# Patient Record
Sex: MALE | Race: WHITE | NOT HISPANIC OR LATINO | URBAN - METROPOLITAN AREA
[De-identification: names, ages, dates, MRNs, and addresses within clinical notes are randomized per-mention and may not be internally consistent; named-entity substitution may affect disease eponyms.]

---

## 2017-11-13 ENCOUNTER — OFFICE VISIT (OUTPATIENT)
Dept: SPORTS MEDICINE | Facility: CLINIC | Age: 62
End: 2017-11-13

## 2017-11-13 VITALS
RESPIRATION RATE: 16 BRPM | HEIGHT: 74 IN | WEIGHT: 203 LBS | HEART RATE: 62 BPM | DIASTOLIC BLOOD PRESSURE: 88 MMHG | SYSTOLIC BLOOD PRESSURE: 126 MMHG | BODY MASS INDEX: 26.05 KG/M2 | OXYGEN SATURATION: 99 %

## 2017-11-13 DIAGNOSIS — M79.604 RIGHT LEG PAIN: Primary | ICD-10-CM

## 2017-11-13 DIAGNOSIS — M54.16 LUMBAR RADICULOPATHY: ICD-10-CM

## 2017-11-13 PROCEDURE — 99204 OFFICE O/P NEW MOD 45 MIN: CPT | Performed by: FAMILY MEDICINE

## 2017-11-13 RX ORDER — PREDNISONE 20 MG/1
TABLET ORAL
Qty: 20 TABLET | Refills: 0 | Status: SHIPPED | OUTPATIENT
Start: 2017-11-13 | End: 2017-12-18

## 2017-11-13 NOTE — PROGRESS NOTES
"Matias is a 62 y.o. year old male    Chief Complaint   Patient presents with   • Lower Extremity Issue     issues with right quad pain for 4 weeks. He has xrays on disc       History of Present Illness  HPI     R leg pain: ×4 weeks.  No known trauma.  He states that over the past year, he has run last then he typically does.  He has run the [place] now 8 times.  He had some setbacks this year in February as he tore his medial meniscus and had this partially removed.  He also ran the [place] on October 8, 2017 which showed thereafter began having pain in his right upper leg.  Admits that he has not been as diligent about training this year which could contribute to some of his symptoms.  History of lumbar disc surgery at L4-5 years ago.  His symptoms he is described as numbness and pain that starts in the right hip down through the anterior upper lower leg.  He is also had difficulty laying on his right side due to the pain.  He has been favoring his right side his gait.  No foot drop.  He's had some muscle twitches as well which he describes as involuntary.  Has been to chiropractor, urgent care, family physician.  Has been on muscle relaxer, Medrol Dosepak, hydrocodone.    He is type II diabetic.    I have reviewed the patient's medical, family, and social history in detail and updated the computerized patient record.    Review of Systems   Constitutional: Negative for fever.   Musculoskeletal:        Per HPI   Skin: Negative for rash.   Neurological: Negative for weakness and numbness.   Psychiatric/Behavioral: Negative for sleep disturbance.   All other systems reviewed and are negative.      /88 (BP Location: Right arm, Patient Position: Sitting, Cuff Size: Adult)  Pulse 62  Resp 16  Ht 74\" (188 cm)  Wt 203 lb (92.1 kg)  SpO2 99%  BMI 26.06 kg/m2     Physical Exam    Vital signs reviewed.   General: No acute distress.  Eyes: conjunctiva clear; pupils equally round and reactive  ENT: " external ears and nose atraumatic; oropharynx clear  CV: no peripheral edema, 2+ distal pulses  Resp: normal respiratory effort, no use of accessory muscles  Skin: no rashes or wounds; normal turgor  Psych: mood and affect appropriate; recent and remote memory intact  Neuro: sensation to light touch intact; 2+ DTR L4, S1 bilateral    MSK Exam:  Ortho Exam    L-spine: No tenderness or warmth; negative straight leg raise bilateral; negative ANGEL bilateral; negative Stenchfield  L hip: No tenderness or warmth; full range of motion; negative logroll  R hip: No warmth; tenderness along the mid substance of the iliotibial band; full range of motion; negative logroll  No pain with single or double leg hop    Outside disc was brought and I reviewed these images independently.  Osteoarthritis of the right hip.  Multilevel degenerative disc disease of the L-spine but no spondylolisthesis.    Diagnoses and all orders for this visit:    Right leg pain  -     Ambulatory Referral to Physical Therapy Evaluate and treat  -     predniSONE (DELTASONE) 20 MG tablet; Take 3 tabs daily for 3 d, then take 2 tabs daily for 3 d, then take 1 tab daily for 3 d, then take 0.5 tab daily for 3 d then stop    Lumbar radiculopathy  -     Ambulatory Referral to Physical Therapy Evaluate and treat  -     predniSONE (DELTASONE) 20 MG tablet; Take 3 tabs daily for 3 d, then take 2 tabs daily for 3 d, then take 1 tab daily for 3 d, then take 0.5 tab daily for 3 d then stop      Discussed differentials.  Doubt stress reaction.  Doubt that iliotibial band could be causing all of his symptoms, especially the numbness and tingling.  Despite what he has been told, I do believe this to be a variant of lumbar radiculopathy and probably a little higher in the L-spine.  I recommend tapering dose of prednisone as well as physical therapy.  Follow-up with me in 6 weeks.    EMR Dragon/Transcription disclaimer:    Much of this encounter note is an electronic  transcription/translation of spoken language to printed text.  The electronic translation of spoken language may permit erroneous, or at times, nonsensical words or phrases to be inadvertently transcribed.  Although I have reviewed the note for such errors some may still exist.

## 2017-11-28 ENCOUNTER — TREATMENT (OUTPATIENT)
Dept: PHYSICAL THERAPY | Facility: CLINIC | Age: 62
End: 2017-11-28

## 2017-11-28 DIAGNOSIS — M79.604 RIGHT LEG PAIN: Primary | ICD-10-CM

## 2017-11-28 DIAGNOSIS — M54.16 RADICULOPATHY, LUMBAR REGION: ICD-10-CM

## 2017-11-28 PROCEDURE — 97161 PT EVAL LOW COMPLEX 20 MIN: CPT | Performed by: PHYSICAL THERAPIST

## 2017-11-28 PROCEDURE — 97112 NEUROMUSCULAR REEDUCATION: CPT | Performed by: PHYSICAL THERAPIST

## 2017-11-28 PROCEDURE — 97110 THERAPEUTIC EXERCISES: CPT | Performed by: PHYSICAL THERAPIST

## 2017-11-28 NOTE — PROGRESS NOTES
"Physical Therapy Initial Evaluation and Plan of Care    TIME IN 1335 TIME OUT 1438  Patient: Matias Palma   : 1955  Diagnosis/ICD-10 Code:  Right leg pain [M79.604]  Referring practitioner: Adelso Parrish *    Subjective Evaluation    History of Present Illness  Date of onset: 10/16/2017  Mechanism of injury: [place] marathon  and 6 days later 3 mile run and 2-3 days later increased leg pain and things \"went off the rails\".   Pt reports twitching in muscles of thigh in multiple thighs with onset of symptoms which has since subsided.  Pt denies back pain.    Currently running 3 days per week 3 miles at a time without too much increased pain after running.         PMH: right knee torn meniscus and surgery and exacerbation of medial right knee pain when running; ran [place] 10 weeks after surgery; 2 surgeries on Left knee arthrocopic surgeries; lacking full knee extension on left. Surgery for ruptured disc L4-5 30 years ago.  Pt has been running regularly for the last decade.      Precautions and Work Restrictions: has been running and biking; no probelem with biking but some discomfort in right hip or thigh with runningPain  Current pain ratin  At best pain ratin  At worst pain ratin (since coming off prednisone)  Location: tighness in proximal anterior right thigh; rarely into calf (regular \"discomfort\" in knees)  Quality: tight and dull ache (previous burning, numbness)  Relieving factors: medications (lying on back with hips and knees 90-90)  Exacerbated by: lying on stomach and bending knee.  Progression: improved    Diagnostic Tests  No diagnostic tests performed    Treatments  Previous treatment: medication (Prednisone stopped Thursday or Friday last week)  Patient Goals  Patient goals for therapy: decreased pain (return to running 30-35 miles per week; biking 100 miles per week/  weekend in September run [place] qualifying time; 2018 wants to get back to " 3:50)             Objective       Static Posture     Shoulders  Rounded.    Scapulae  Right depressed.    Thoracic Spine  Hyperkyphosis.    Lumbar Spine   Flattened.     Palpation   Left   No palpable tenderness to the lumbar paraspinals and quadratus lumborum.     Right   No palpable tenderness to the lumbar paraspinals and quadratus lumborum. Tenderness of the rectus femoris.     Right Hip Palpation Comments   Rectus femoris: distal right quadriceps.     Right Knee Comments  Right rectus femoris: distal right quadriceps.     Neurological Testing   Sensation     Lumbar   Left   Intact: light touch    Right   Intact: light touch    Active Range of Motion     Lumbar   Flexion: 40 degrees   Extension: 20 degrees   Left lateral flexion: 25 degrees   Right lateral flexion: 20 degrees     Additional Active Range of Motion Details  Lumbar AROM gross assessment Right rotation and left rotation 100% without pain.    Passive Range of Motion     Additional Passive Range of Motion Details  Hip PROM grossly limited R > L for IR and ER  Hip extension passive limited secondary to tension in quadriceps    Strength/Myotome Testing     Left Hip   Planes of Motion   Flexion: 4+  Extension: 4-  Abduction: 4    Right Hip   Planes of Motion   Flexion: 4+  Extension: 4-  Abduction: 4+    Tests       Thoracic   Negative slump.     Lumbar     Left   Negative passive SLR.     Right   Negative passive SLR.     Additional Tests Details  ANGEL (-) Bilaterally  ASLR (-) Bilaterally  Adduction drop test R (+); L (+)   Prone knee bend R (+); L (-)   Lumbar CPA's L1-5 hypomobile and pain-free  Sahrmann for lumbar stabilization 0.25/5 with ability to maintain neutral spine during supine march < 90 deg    Ambulation     Quality of Movement During Gait     Pelvis    Pelvis (Left): Positive Trendelenburg.   Pelvis (Right): Positive Trendelenburg.     Functional Assessment   Squat   Left tibial anterior translation beyond toes and right tibial anterior  "translation beyond toes. No pain, no left valgus and no right valgus.     Single Leg Squat   Left Leg  Positive Trendelenburg, valgus and anterior tibial translation beyond toes.     Right Leg  Positive Trendelenburg, valgus and tibial anterior translation beyond toes.     Forward Step Down 6\"   Left Leg  Pain, positive Trendelenburg and valgus.     Right Leg  Positive Trendelenburg and valgus. No pain.          Assessment & Plan     Assessment  Impairments: impaired balance, impaired physical strength, lacks appropriate home exercise program and pain with function  Assessment details: Pt is active 62 y.o. Male who presents with signs and symptoms consistent with lumbar radiculopathy affecting right anterior thigh.  Pt demonstrates hip and trunk weakness, decreased neural mobility, and decreased LE neuromuscular control.  Pt requires skilled physical therapy to address impairments in order to return to prior level of function including marathon training without increased pain or discomfort.   Prognosis: good  Functional Limitations: uncomfortable because of pain  Goals  Plan Goals: Short-term Goals - In 2 weeks:  1. Pt will be (I) with initial HEP.  2. Pt will perform 4 inch step down bilaterally without pain and without valgus position or contralateral pelvic drop.  3. Pt will perform bilateral squat with equal weight bearing, no pain and (-) anterior translation of tibias.    Long-term Goals - In 6 weeks:  1. Pt will perform 6 inch step down without contralateral pelvic drop or valgus and without pain.  2. Pt will resume marathon training schedule without hip or thigh tightness or discomfort.  3. Sahrmann for lumbar stabilization score improve to 0.75/5.  4. FMS for rotary stability score 2/3 bilaterally.  5. LEFS score improve to 78/80.    Plan  Therapy options: will be seen for skilled physical therapy services  Planned modality interventions: cryotherapy, electrical stimulation/Guamanian stimulation and " thermotherapy (hydrocollator packs)  Planned therapy interventions: abdominal trunk stabilization, balance/weight-bearing training, functional ROM exercises, home exercise program, joint mobilization, manual therapy, neuromuscular re-education, soft tissue mobilization, spinal/joint mobilization, stretching, strengthening and therapeutic activities  Frequency: 2x week  Duration in weeks: 6  Treatment plan discussed with: patient        Manual Therapy:    -     mins  90402;  Therapeutic Exercise:    15     mins  11146;     Neuromuscular Osbaldo:    10    mins  60260;    Therapeutic Activity:     -     mins  17353;     Gait Training:      -     mins  61912;     Ultrasound:     -     mins  40826;    Electrical Stimulation:    -     mins  54995 ( );  Dry Needling     -     mins self-pay    Timed Treatment:   25   mins   Total Treatment:     63   mins    PT SIGNATURE: Ronda Marcos, PT, DPT   DATE TREATMENT INITIATED: 11/28/2017    Initial Certification  Certification Period: 2/26/2018  I certify that the therapy services are furnished while this patient is under my care.  The services outlined above are required by this patient, and will be reviewed every 90 days.     PHYSICIAN: Adelso Parrish Jr., DO      DATE:     Please sign and return via fax to [phone]. Thank you, [facility].

## 2017-11-29 NOTE — PATIENT INSTRUCTIONS
Purpose of treatment  Pathology and involved anatomy  HEP performance  Please view My [facility] Matias Palma for a complete list of HEP instructions.

## 2017-12-05 ENCOUNTER — TREATMENT (OUTPATIENT)
Dept: PHYSICAL THERAPY | Facility: CLINIC | Age: 62
End: 2017-12-05

## 2017-12-05 DIAGNOSIS — M54.16 RADICULOPATHY, LUMBAR REGION: ICD-10-CM

## 2017-12-05 DIAGNOSIS — M79.604 RIGHT LEG PAIN: Primary | ICD-10-CM

## 2017-12-05 PROCEDURE — 97110 THERAPEUTIC EXERCISES: CPT | Performed by: PHYSICAL THERAPIST

## 2017-12-05 PROCEDURE — 97140 MANUAL THERAPY 1/> REGIONS: CPT | Performed by: PHYSICAL THERAPIST

## 2017-12-05 NOTE — PATIENT INSTRUCTIONS
Purpose of treatment  HEP performance  Please view My [facility] Matias Palma for a complete list of HEP instructions.  Appropriate response to treatment

## 2017-12-05 NOTE — PROGRESS NOTES
"Physical Therapy Daily Progress Note    Time In 0831  Time Out 0915    Matias Palma reports: improvement in leg symptoms with only some occasional \"tightness\" in anterior right thigh. Pt reports running 5 miles this weekend without increased pain or tightness.    Subjective     Objective   See Exercise, Manual, and Modality Logs for complete treatment.       Assessment & Plan     Assessment  Assessment details: Pt reported decreased leg symptoms with long-axis distraction for right hip including hip ER.  Pt reported increased leg symptoms with long axis distraction including hip IR.  Initiated additional hip and trunk strengthening today with mild difficulty and without hip pain.  Pt instructed on self-hip mobilization and stretch for lateral distraction due to response to manual treatments with improved symptoms during hip distraction.        Progress strengthening /stabilization /functional activity           Manual Therapy:    8     mins  77952;  Therapeutic Exercise:    32     mins  71277;     Neuromuscular Osbaldo:    -    mins  11919;    Therapeutic Activity:     --     mins  64445;     Gait Training:      -     mins  87776;     Ultrasound:     -     mins  50732;    Electrical Stimulation:    -     mins  30262 ( );  Dry Needling     -     mins self-pay    Timed Treatment:   40   mins   Total Treatment:     44   mins    Ronda Marcos, PT, DPT  Physical Therapist    "

## 2017-12-07 ENCOUNTER — TREATMENT (OUTPATIENT)
Dept: PHYSICAL THERAPY | Facility: CLINIC | Age: 62
End: 2017-12-07

## 2017-12-07 DIAGNOSIS — M79.604 RIGHT LEG PAIN: Primary | ICD-10-CM

## 2017-12-07 DIAGNOSIS — M54.16 RADICULOPATHY, LUMBAR REGION: ICD-10-CM

## 2017-12-07 PROCEDURE — 97112 NEUROMUSCULAR REEDUCATION: CPT | Performed by: PHYSICAL THERAPIST

## 2017-12-07 PROCEDURE — A9999 DME SUPPLY OR ACCESSORY, NOS: HCPCS | Performed by: PHYSICAL THERAPIST

## 2017-12-07 PROCEDURE — 97110 THERAPEUTIC EXERCISES: CPT | Performed by: PHYSICAL THERAPIST

## 2017-12-07 PROCEDURE — 97140 MANUAL THERAPY 1/> REGIONS: CPT | Performed by: PHYSICAL THERAPIST

## 2017-12-07 NOTE — PATIENT INSTRUCTIONS
Purpose of treatment  Appropriate response to treatment  HEP performance  Please view My [facility] Matias Palma for a complete list of HEP instructions.  Dispensed Theraband for HEP performance

## 2017-12-07 NOTE — PROGRESS NOTES
"Physical Therapy Daily Progress Note    Time In 0842  Time Out 0945    Matias Palma reports: doing well and ran on track on Tuesday with minimal difficulty and continued leg \"tightness\".     Subjective     Objective   See Exercise, Manual, and Modality Logs for complete treatment.       Assessment & Plan     Assessment  Assessment details: Progressed single-leg balance and hip strengthening activities today.  Pt required mild verbal and tactile cues to avoid pelvis rotation during side-lying hip external rotation/clamshell exercise.  Pt demonstrates some improvement in balance, but continued difficulty requiring stand-by assist 50% of the time.  Pt continues to report increased hip and thigh discomfort during hip IR indicating possible anterior hip impingement and hip mobilizations continued to be performed in neutral or ER position.        Progress strengthening /stabilization /functional activity           Manual Therapy:    8     mins  25349;  Therapeutic Exercise:    35     mins  16058;     Neuromuscular Osbaldo:    12    mins  87326;    Therapeutic Activity:     -     mins  97893;     Gait Training:      -     mins  22162;     Ultrasound:     -     mins  70088;    Electrical Stimulation:    -     mins  22474 ( );  Dry Needling     -     mins self-pay    Timed Treatment:   55   mins   Total Treatment:     63   mins    Ronda Marcos, PT, DPT  Physical Therapist    "

## 2017-12-14 ENCOUNTER — TREATMENT (OUTPATIENT)
Dept: PHYSICAL THERAPY | Facility: CLINIC | Age: 62
End: 2017-12-14

## 2017-12-14 DIAGNOSIS — M79.604 RIGHT LEG PAIN: Primary | ICD-10-CM

## 2017-12-14 DIAGNOSIS — M54.16 RADICULOPATHY, LUMBAR REGION: ICD-10-CM

## 2017-12-14 PROCEDURE — 97140 MANUAL THERAPY 1/> REGIONS: CPT | Performed by: PHYSICAL THERAPIST

## 2017-12-14 PROCEDURE — 97110 THERAPEUTIC EXERCISES: CPT | Performed by: PHYSICAL THERAPIST

## 2017-12-14 PROCEDURE — 97112 NEUROMUSCULAR REEDUCATION: CPT | Performed by: PHYSICAL THERAPIST

## 2017-12-14 NOTE — PATIENT INSTRUCTIONS
Ice application for knee inflammation  HEP performance  Please view My [facility] Matias Palma for a complete list of HEP instructions.  Purpose of treatment  Pathology and involved anatomy

## 2017-12-14 NOTE — PROGRESS NOTES
Physical Therapy Daily Progress Note    Time In 1030  Time Out 1124    Matias Palma reports: increased right anterior hip pain after jogging 3 miles around an indoor track going clockwise the whole time.  Pt also reports left knee pain and associates pain with chronic condition of knee that has been aggravated with single-leg balance activities.      Subjective     Objective       Tests     Additional Tests Details  Supine leg length (+) right longer       See Exercise, Manual, and Modality Logs for complete treatment.       Assessment & Plan     Assessment  Assessment details: Pt presented with signs and symptoms consistent with right anterior innominate rotation and responded well to MET with improved leg length and reported improved symptoms.  Pt tolerated treatment session well without increased pain, including no increase in left knee pain.  Pt is progressing well under current treatment plan towards goals, and additional exercises added to HEP to address possible anterior innominate rotation and decrease pain.        Progress strengthening /stabilization /functional activity         Manual Therapy:    10     mins  88796;  Therapeutic Exercise:    18     mins  02613;     Neuromuscular Osbaldo:    10    mins  90522;    Therapeutic Activity:     -     mins  24095;     Gait Training:      -     mins  87322;     Ultrasound:     -     mins  64848;    Electrical Stimulation:    -     mins  66487 ( );  Dry Needling     -     mins self-pay    Timed Treatment:   38   mins direct  Total Treatment:     54   mins    Ronda Marcos, PT, DPT  Physical Therapist

## 2017-12-18 ENCOUNTER — TREATMENT (OUTPATIENT)
Dept: PHYSICAL THERAPY | Facility: CLINIC | Age: 62
End: 2017-12-18

## 2017-12-18 ENCOUNTER — OFFICE VISIT (OUTPATIENT)
Dept: SPORTS MEDICINE | Facility: CLINIC | Age: 62
End: 2017-12-18

## 2017-12-18 VITALS
HEIGHT: 74 IN | TEMPERATURE: 98.1 F | WEIGHT: 206 LBS | BODY MASS INDEX: 26.44 KG/M2 | DIASTOLIC BLOOD PRESSURE: 60 MMHG | OXYGEN SATURATION: 98 % | SYSTOLIC BLOOD PRESSURE: 106 MMHG | HEART RATE: 58 BPM

## 2017-12-18 DIAGNOSIS — M54.16 LUMBAR RADICULOPATHY: Primary | ICD-10-CM

## 2017-12-18 DIAGNOSIS — M79.604 RIGHT LEG PAIN: Primary | ICD-10-CM

## 2017-12-18 DIAGNOSIS — M79.604 RIGHT LEG PAIN: ICD-10-CM

## 2017-12-18 DIAGNOSIS — M25.551 RIGHT HIP PAIN: ICD-10-CM

## 2017-12-18 DIAGNOSIS — M54.16 RADICULOPATHY, LUMBAR REGION: ICD-10-CM

## 2017-12-18 PROCEDURE — 97110 THERAPEUTIC EXERCISES: CPT | Performed by: PHYSICAL THERAPIST

## 2017-12-18 PROCEDURE — 99213 OFFICE O/P EST LOW 20 MIN: CPT | Performed by: FAMILY MEDICINE

## 2017-12-18 PROCEDURE — 97112 NEUROMUSCULAR REEDUCATION: CPT | Performed by: PHYSICAL THERAPIST

## 2017-12-18 RX ORDER — IBUPROFEN 200 MG
200 TABLET ORAL EVERY 6 HOURS PRN
COMMUNITY

## 2017-12-18 NOTE — PROGRESS NOTES
"Physical Therapy Daily Progress Note    Time In 1200  Time Out 1300    Matias Palma reports: Dr. Parrish moved hip around and right hip felt fatigued.  Pt reports follow up with MD as needed.  Pt reports continued mild thigh symptoms    Subjective     Objective       Tests     Additional Tests Details  Supine leg length (+) Left longer       See Exercise, Manual, and Modality Logs for complete treatment.       Assessment & Plan     Assessment  Assessment details: Initiated self-hip mobilizations today to improve hip mobility and decrease pain.  Also initiated additional hip strengthening CKC activities.  Pt tolerated treatment well with mild difficulty and without pain.  Pt reported \"tightness\" in anterior right thigh during self-hip mobilizations, likely associated with muscle tension.      Progress strengthening /stabilization /functional activity           Manual Therapy:    10     mins  46724;  Therapeutic Exercise:    30     mins  72275;     Neuromuscular Osbaldo:    10    mins  32882;    Therapeutic Activity:     -     mins  45571;     Gait Training:      -     mins  27643;     Ultrasound:     -     mins  89454;    Electrical Stimulation:    -     mins  76990 ( );  Dry Needling     -     mins self-pay    Timed Treatment:   50   mins   Total Treatment:     60   mins    Ronda Marcos, PT, DPT  Physical Therapist    "

## 2017-12-18 NOTE — PROGRESS NOTES
"Matias is a 62 y.o. year old male    Chief Complaint   Patient presents with   • Leg Pain     right leg follow up        History of Present Illness  HPI     Lumbar radiculopathy: improved after completing tapering prednisone. Has gone to PT with Ronda and \"things seem to be going well there.\" Still c/o \"tightness\" in R lower quad ,\"something a little off.\" Running last week, 3 miles, had some discomfort in anterior groin but improved the next day. Has yet to try long run. Longest run since completing pred has been 5 miles.    I have reviewed the patient's medical, family, and social history in detail and updated the computerized patient record.    Review of Systems   Constitutional: Negative for fever.   Musculoskeletal:        Per HPI   Skin: Negative for rash.   Neurological: Negative for weakness and numbness.   Psychiatric/Behavioral: Negative for sleep disturbance.   All other systems reviewed and are negative.      /60 (BP Location: Left arm, Patient Position: Sitting, Cuff Size: Adult)  Pulse 58  Temp 98.1 °F (36.7 °C) (Oral)   Ht 188 cm (74\")  Wt 93.4 kg (206 lb)  SpO2 98%  BMI 26.45 kg/m2     Physical Exam    Vital signs reviewed.   General: No acute distress.  Eyes: conjunctiva clear; pupils equally round and reactive  ENT: external ears and nose atraumatic; oropharynx clear  CV: no peripheral edema, 2+ distal pulses  Resp: normal respiratory effort, no use of accessory muscles  Skin: no rashes or wounds; normal turgor  Psych: mood and affect appropriate; recent and remote memory intact  Neuro: sensation to light touch intact; 2+ DTR L4, S1 bilateral    MSK Exam:  Ortho Exam    L-spine: No tenderness or warmth; negative straight leg raise bilateral; negative ANGEL bilateral; negative Stenchfield  L hip: No tenderness or warmth; full range of motion; negative logroll  R hip: No warmth; tenderness along the anterior joint capsule; lacks approximately 5° of internal rotation; negative " logroll    Diagnoses and all orders for this visit:    Lumbar radiculopathy    Right leg pain    Right hip pain    Other orders  -     ibuprofen (ADVIL,MOTRIN) 200 MG tablet; Take 200 mg by mouth Every 6 (Six) Hours As Needed for Mild Pain .      Symptoms improving.  Believe that his primary pain generator was lumbar radiculopathy which has improved favorably.  Recommend to continue with physical therapy.  If his treatment plateaus, consider further workup of his hip as I believe this could be secondary generator.  Did discuss possibility of injections.  Continue to monitor.  Follow-up when necessary.    EMR Dragon/Transcription disclaimer:    Much of this encounter note is an electronic transcription/translation of spoken language to printed text.  The electronic translation of spoken language may permit erroneous, or at times, nonsensical words or phrases to be inadvertently transcribed.  Although I have reviewed the note for such errors some may still exist.

## 2017-12-21 ENCOUNTER — TREATMENT (OUTPATIENT)
Dept: PHYSICAL THERAPY | Facility: CLINIC | Age: 62
End: 2017-12-21

## 2017-12-21 DIAGNOSIS — M79.604 RIGHT LEG PAIN: Primary | ICD-10-CM

## 2017-12-21 DIAGNOSIS — M54.16 RADICULOPATHY, LUMBAR REGION: ICD-10-CM

## 2017-12-21 PROCEDURE — 97110 THERAPEUTIC EXERCISES: CPT | Performed by: PHYSICAL THERAPIST

## 2017-12-21 PROCEDURE — 97112 NEUROMUSCULAR REEDUCATION: CPT | Performed by: PHYSICAL THERAPIST

## 2017-12-21 NOTE — PROGRESS NOTES
"Physical Therapy Daily Progress Note    Time In 0852  Time Out 0940    Matias Palma reports: no tightness in the thigh this morning and overall everything is doing \"great\".  Pt reports bilateral leg soreness due to tough bike ride yesterday.     Subjective     Objective   See Exercise, Manual, and Modality Logs for complete treatment.       Assessment & Plan     Assessment  Assessment details: Deferred manual therapy today due to no pain pre-treatment and to progress patient to more active strategies.  Pt demonstrated muscle fatigue during treatment but no increased pain.  Progressed HEP today with additional hip strengthening exercises for home.  Pt is progressing well under current treatment plan and may only require 1 more week of PT to progress to HEP as long as symptoms continue to be well managed.         Progress strengthening /stabilization /functional activity           Manual Therapy:    -     mins  49938;  Therapeutic Exercise:    28     mins  00492;     Neuromuscular Osbaldo:    10    mins  73653;    Therapeutic Activity:     -     mins  35320;     Gait Training:      -     mins  41940;     Ultrasound:     -     mins  82146;    Electrical Stimulation:    -     mins  44304 ( );  Dry Needling     -     mins self-pay    Timed Treatment:   38   mins   Total Treatment:     48   mins    Ronda Marcos, PT, DPT  Physical Therapist    "

## 2017-12-27 ENCOUNTER — TREATMENT (OUTPATIENT)
Dept: PHYSICAL THERAPY | Facility: CLINIC | Age: 62
End: 2017-12-27

## 2017-12-27 DIAGNOSIS — M54.16 RADICULOPATHY, LUMBAR REGION: ICD-10-CM

## 2017-12-27 DIAGNOSIS — M79.604 RIGHT LEG PAIN: Primary | ICD-10-CM

## 2017-12-27 PROCEDURE — 97110 THERAPEUTIC EXERCISES: CPT | Performed by: PHYSICAL THERAPIST

## 2017-12-27 PROCEDURE — A9999 DME SUPPLY OR ACCESSORY, NOS: HCPCS | Performed by: PHYSICAL THERAPIST

## 2017-12-27 PROCEDURE — 97530 THERAPEUTIC ACTIVITIES: CPT | Performed by: PHYSICAL THERAPIST

## 2017-12-27 NOTE — PROGRESS NOTES
Physical Therapy Daily Progress Note    Time In 0903  Time Out 1006    Matias Palma reports: doing well pre-treatment.  Pt reports no pressure on right thigh during sitting or other ADLs.    Subjective     Objective       Tests     Additional Tests Details  Prone knee bend R (+); and L (-)     See Exercise, Manual, and Modality Logs for complete treatment.       Assessment & Plan     Assessment  Assessment details: Progressed LE and trunk strengthening today.  Pt required mild verbal and visual cues for posterior weight shift during weight bearing strength training.  Pt demonstrated fatigue during and post-treatment and demonstrated loss of balance 50% of time during balance activities that pt recovered with placing down opposite foot.      Progress strengthening /stabilization /functional activity           Manual Therapy:    -     mins  47098;  Therapeutic Exercise:    40    mins  85964;     Neuromuscular Osbaldo:    -    mins  97037;    Therapeutic Activity:     15     mins  28849;     Gait Training:      -     mins  24686;     Ultrasound:     -     mins  81204;    Electrical Stimulation:    -     mins  14082 ( );  Dry Needling     -     mins self-pay    Timed Treatment:   55   mins   Total Treatment:     63   mins    Ronda Marcos, PT, DPT  Physical Therapist

## 2017-12-27 NOTE — PATIENT INSTRUCTIONS
Purpose of treatment  HEP performance  Please view My [facility] Matias Palma for a complete list of HEP instructions.  Dispensed Theraband for HEP performance

## 2017-12-29 ENCOUNTER — TREATMENT (OUTPATIENT)
Dept: PHYSICAL THERAPY | Facility: CLINIC | Age: 62
End: 2017-12-29

## 2017-12-29 DIAGNOSIS — M54.16 RADICULOPATHY, LUMBAR REGION: ICD-10-CM

## 2017-12-29 DIAGNOSIS — M79.604 RIGHT LEG PAIN: Primary | ICD-10-CM

## 2017-12-29 PROCEDURE — 97112 NEUROMUSCULAR REEDUCATION: CPT | Performed by: PHYSICAL THERAPIST

## 2017-12-29 PROCEDURE — 97110 THERAPEUTIC EXERCISES: CPT | Performed by: PHYSICAL THERAPIST

## 2017-12-29 NOTE — PROGRESS NOTES
Physical Therapy Daily Progress Note    Time In 0900  Time Out 1004    Matias Palma reports: doing well and bridges from last time were easy.     Subjective     Objective   See Exercise, Manual, and Modality Logs for complete treatment.       Assessment & Plan     Assessment  Assessment details: Reviewed HEP performance with patient and strategies to progress LE strengthening and bridge activity in particular.  Pt demonstrated moderate difficulty with balance and maintaining neutral spine during straight leg windmill in dynamic warm up.  Pt balance and local lumbar spine control improved with verbal and visual cues.          Progress per Plan of Care and Anticipate DC next Visit   1 more visit to progress to (I)( with HEP and d/c to HEP           Manual Therapy:    -     mins  88139;  Therapeutic Exercise:    43     mins  64207;     Neuromuscular Osbaldo:    10    mins  05910;    Therapeutic Activity:     -     mins  60815;     Gait Training:      -     mins  04283;     Ultrasound:     -     mins  14815;    Electrical Stimulation:    -     mins  56009 ( );  Dry Needling     -     mins self-pay    Timed Treatment:   53   mins   Total Treatment:     64   mins    Ronda Marcos, PT, DPT  Physical Therapist

## 2018-01-04 ENCOUNTER — TREATMENT (OUTPATIENT)
Dept: PHYSICAL THERAPY | Facility: CLINIC | Age: 63
End: 2018-01-04

## 2018-01-04 DIAGNOSIS — M79.604 RIGHT LEG PAIN: Primary | ICD-10-CM

## 2018-01-04 DIAGNOSIS — M54.16 RADICULOPATHY, LUMBAR REGION: ICD-10-CM

## 2018-01-04 PROCEDURE — 97530 THERAPEUTIC ACTIVITIES: CPT | Performed by: PHYSICAL THERAPIST

## 2018-01-04 PROCEDURE — 97110 THERAPEUTIC EXERCISES: CPT | Performed by: PHYSICAL THERAPIST

## 2018-01-04 NOTE — PROGRESS NOTES
"Discharge Report    Patient Name: Matias Palma       Patient MRN: [id number]  : 1955  Physician:Adelso Parrish Jr., DO  Date: 2018    Encounter Diagnoses   Name Primary?   • Right leg pain Yes   • Radiculopathy, lumbar region        Treatment has included therapeutic exercise, neuromuscular re-education, manual therapy and therapeutic activity    Physical Therapy Daily Progress Note    Time In 0831  Time Out 0930    Matias Palma reports: doing well and feels that symptoms are managed.  Pt reports doing well with running and training for bike race.     Subjective     Objective       Functional Assessment   Squat No pain, not sitting toward left side, no left tibial anterior translation beyond toes, not sitting toward right side and no right tibial anterior translation beyond toes.     Forward Step Down 6\"     Right Leg  No pain, negative Trendelenburg and no valgus.     Comments  Step down performed on 4 inch step       See Exercise, Manual, and Modality Logs for complete treatment.       Assessment & Plan     Assessment  Assessment details: Pt demonstrates improved mechanics with bilateral squat with equal weight bearing and improved LE neuromuscular control during step down activity.  Pt demonstrates excellent understanding and compliance with HEP and is appropriate for d/c to (I) with HEP.        Other - D/c to (I) with HEP    Progress towards goals: Partially Met    Discharge Reason: Anticipate patient will achieve long-term goals independently      Plan of Care: Continue with current home exercise program as instructed    Prognosis: excellent    Understanding at Discharge: excellent      Ronda Marcos, PT, DPT  Physical Therapist  "

## 2018-01-04 NOTE — PROGRESS NOTES
"Physical Therapy Daily Progress Note    Time In 0831  Time Out 0930    Matias Palma reports: doing well and feels that symptoms are managed.  Pt reports doing well with running and training for bike race.     Subjective     Objective       Functional Assessment   Squat No pain, not sitting toward left side, no left tibial anterior translation beyond toes, not sitting toward right side and no right tibial anterior translation beyond toes.     Forward Step Down 6\"     Right Leg  No pain, negative Trendelenburg and no valgus.     Comments  Step down performed on 4 inch step       See Exercise, Manual, and Modality Logs for complete treatment.       Assessment & Plan     Assessment  Assessment details: Pt demonstrates improved mechanics with bilateral squat with equal weight bearing and improved LE neuromuscular control during step down activity.  Pt demonstrates excellent understanding and compliance with HEP and is appropriate for d/c to (I) with HEP.        Other - D/c to (I) with HEP           Manual Therapy:    -     mins  09603;  Therapeutic Exercise:    45     mins  01503;     Neuromuscular Osbaldo:    -    mins  90231;    Therapeutic Activity:     10     mins  47405;     Gait Training:      -     mins  02425;     Ultrasound:     -     mins  34379;    Electrical Stimulation:    -     mins  56706 ( );  Dry Needling     -     mins self-pay    Timed Treatment:   55   mins   Total Treatment:     59   mins    Ronda Marcos, PT, DPT  Physical Therapist    "

## 2018-01-04 NOTE — PATIENT INSTRUCTIONS
Purpose of treatment  Continued HEP performance  Please view [facility] Matias Palma for a complete list of HEP instructions.